# Patient Record
Sex: FEMALE | Race: WHITE | NOT HISPANIC OR LATINO | Employment: OTHER | URBAN - METROPOLITAN AREA
[De-identification: names, ages, dates, MRNs, and addresses within clinical notes are randomized per-mention and may not be internally consistent; named-entity substitution may affect disease eponyms.]

---

## 2019-02-01 ENCOUNTER — HOSPITAL ENCOUNTER (OUTPATIENT)
Dept: RADIOLOGY | Facility: HOSPITAL | Age: 84
Discharge: HOME | End: 2019-02-01
Attending: FAMILY MEDICINE
Payer: MEDICARE

## 2019-02-01 ENCOUNTER — CLINICAL SUPPORT (OUTPATIENT)
Dept: CARDIOLOGY | Facility: CLINIC | Age: 84
End: 2019-02-01
Payer: MEDICARE

## 2019-02-01 ENCOUNTER — OFFICE VISIT (OUTPATIENT)
Dept: FAMILY MEDICINE | Facility: CLINIC | Age: 84
End: 2019-02-01
Payer: MEDICARE

## 2019-02-01 VITALS
BODY MASS INDEX: 28.28 KG/M2 | TEMPERATURE: 100 F | WEIGHT: 140.31 LBS | HEART RATE: 128 BPM | HEIGHT: 59 IN | SYSTOLIC BLOOD PRESSURE: 122 MMHG | OXYGEN SATURATION: 96 % | DIASTOLIC BLOOD PRESSURE: 88 MMHG

## 2019-02-01 DIAGNOSIS — I25.10 CORONARY ARTERY DISEASE INVOLVING NATIVE CORONARY ARTERY, ANGINA PRESENCE UNSPECIFIED, UNSPECIFIED WHETHER NATIVE OR TRANSPLANTED HEART: ICD-10-CM

## 2019-02-01 DIAGNOSIS — K21.9 GASTROESOPHAGEAL REFLUX DISEASE, ESOPHAGITIS PRESENCE NOT SPECIFIED: ICD-10-CM

## 2019-02-01 DIAGNOSIS — R05.9 COUGH: ICD-10-CM

## 2019-02-01 DIAGNOSIS — D64.89 ANEMIA DUE TO OTHER CAUSE, NOT CLASSIFIED: ICD-10-CM

## 2019-02-01 DIAGNOSIS — R06.02 SOB (SHORTNESS OF BREATH): ICD-10-CM

## 2019-02-01 DIAGNOSIS — R05.9 COUGH: Primary | ICD-10-CM

## 2019-02-01 DIAGNOSIS — H61.22 CERUMINOSIS, LEFT: ICD-10-CM

## 2019-02-01 DIAGNOSIS — E03.9 ACQUIRED HYPOTHYROIDISM: ICD-10-CM

## 2019-02-01 DIAGNOSIS — R00.0 TACHYCARDIA: ICD-10-CM

## 2019-02-01 PROCEDURE — 99999 PR PBB SHADOW E&M-EST. PATIENT-LVL I: CPT | Mod: PBBFAC,,,

## 2019-02-01 PROCEDURE — 93010 ELECTROCARDIOGRAM REPORT: CPT | Mod: ,,, | Performed by: INTERNAL MEDICINE

## 2019-02-01 PROCEDURE — 99215 OFFICE O/P EST HI 40 MIN: CPT | Mod: S$PBB,,, | Performed by: FAMILY MEDICINE

## 2019-02-01 PROCEDURE — 99215 PR OFFICE/OUTPT VISIT, EST, LEVL V, 40-54 MIN: ICD-10-PCS | Mod: S$PBB,,, | Performed by: FAMILY MEDICINE

## 2019-02-01 PROCEDURE — 99999 PR PBB SHADOW E&M-NEW PATIENT-LVL IV: ICD-10-PCS | Mod: PBBFAC,,, | Performed by: FAMILY MEDICINE

## 2019-02-01 PROCEDURE — 99999 PR PBB SHADOW E&M-NEW PATIENT-LVL IV: CPT | Mod: PBBFAC,,, | Performed by: FAMILY MEDICINE

## 2019-02-01 PROCEDURE — 99211 OFF/OP EST MAY X REQ PHY/QHP: CPT | Mod: PBBFAC,25,27,PO

## 2019-02-01 PROCEDURE — 99204 OFFICE O/P NEW MOD 45 MIN: CPT | Mod: PBBFAC,25,PO | Performed by: FAMILY MEDICINE

## 2019-02-01 PROCEDURE — 71046 XR CHEST PA AND LATERAL: ICD-10-PCS | Mod: 26,,, | Performed by: RADIOLOGY

## 2019-02-01 PROCEDURE — 71046 X-RAY EXAM CHEST 2 VIEWS: CPT | Mod: 26,,, | Performed by: RADIOLOGY

## 2019-02-01 PROCEDURE — 99999 PR PBB SHADOW E&M-EST. PATIENT-LVL I: ICD-10-PCS | Mod: PBBFAC,,,

## 2019-02-01 PROCEDURE — 93005 ELECTROCARDIOGRAM TRACING: CPT | Mod: PBBFAC,PO | Performed by: FAMILY MEDICINE

## 2019-02-01 PROCEDURE — 71046 X-RAY EXAM CHEST 2 VIEWS: CPT | Mod: TC,FY,PO

## 2019-02-01 PROCEDURE — 93010 EKG 12-LEAD: ICD-10-PCS | Mod: ,,, | Performed by: INTERNAL MEDICINE

## 2019-02-01 RX ORDER — GUAIFENESIN 600 MG/1
1200 TABLET, EXTENDED RELEASE ORAL 2 TIMES DAILY
COMMUNITY

## 2019-02-01 RX ORDER — BENZONATATE 100 MG/1
100 CAPSULE ORAL 3 TIMES DAILY PRN
Qty: 30 CAPSULE | Refills: 0 | Status: SHIPPED | OUTPATIENT
Start: 2019-02-01 | End: 2019-02-11

## 2019-02-01 RX ORDER — FLUTICASONE PROPIONATE 50 MCG
1 SPRAY, SUSPENSION (ML) NASAL DAILY
COMMUNITY

## 2019-02-01 RX ORDER — FUROSEMIDE 20 MG/1
20 TABLET ORAL
COMMUNITY

## 2019-02-01 RX ORDER — OMEPRAZOLE 40 MG/1
40 CAPSULE, DELAYED RELEASE ORAL DAILY
Qty: 30 CAPSULE | Refills: 1 | Status: SHIPPED | OUTPATIENT
Start: 2019-02-01 | End: 2020-02-01

## 2019-02-01 RX ORDER — METOPROLOL SUCCINATE 25 MG/1
12.5 TABLET, EXTENDED RELEASE ORAL
COMMUNITY
Start: 2013-09-06

## 2019-02-01 RX ORDER — POTASSIUM CHLORIDE 750 MG/1
10 CAPSULE, EXTENDED RELEASE ORAL
COMMUNITY
Start: 2018-06-12

## 2019-02-01 NOTE — PATIENT INSTRUCTIONS
Acute Bronchitis  Your healthcare provider has told you that you have acute bronchitis. Bronchitis is infection or inflammation of the bronchial tubes (airways in the lungs). Normally, air moves easily in and out of the airways. Bronchitis narrows the airways, making it harder for air to flow in and out of the lungs. This causes symptoms such as shortness of breath, coughing up yellow or green mucus, and wheezing. Bronchitis can be acute or chronic. Acute means the condition comes on quickly and goes away in a short time, usually within 3 to 10 days. Chronic means a condition lasts a long time and often comes back.    What causes acute bronchitis?  Acute bronchitis almost always starts as a viral respiratory infection, such as a cold or the flu. Certain factors make it more likely for a cold or flu to turn into bronchitis. These include being very young, being elderly, having a heart or lung problem, or having a weak immune system. Cigarette smoking also makes bronchitis more likely.  When bronchitis develops, the airways become swollen. The airways may also become infected with bacteria. This is known as a secondary infection.  Diagnosing acute bronchitis  Your healthcare provider will examine you and ask about your symptoms and health history. You may also have a sputum culture to test the fluid in your lungs. Chest X-rays may be done to look for infection in the lungs.  Treating acute bronchitis  Bronchitis usually clears up as the cold or flu goes away. You can help feel better faster by doing the following:  · Take medicine as directed. You may be told to take ibuprofen or other over-the-counter medicines. These help relieve inflammation in your bronchial tubes. Your healthcare provider may prescribe an inhaler to help open up the bronchial tubes. Most of the time, acute bronchitis is caused by a viral infection. Antibiotics are usually not prescribed for viral infections.  · Drink plenty of fluids, such as  water, juice, or warm soup. Fluids loosen mucus so that you can cough it up. This helps you breathe more easily. Fluids also prevent dehydration.  · Make sure you get plenty of rest.  · Do not smoke. Do not allow anyone else to smoke in your home.  Recovery and follow-up  Follow up with your doctor as you are told. You will likely feel better in a week or two. But a dry cough can linger beyond that time. Let your doctor know if you still have symptoms (other than a dry cough) after 2 weeks, or if youre prone to getting bronchial infections. Take steps to protect yourself from future infections. These steps include stopping smoking and avoiding tobacco smoke, washing your hands often, and getting a yearly flu shot.  When to call your healthcare provider  Call the healthcare provider if you have any of the following:  · Fever of 100.4°F (38.0°C) or higher, or as advised  · Symptoms that get worse, or new symptoms  · Trouble breathing  · Symptoms that dont start to improve within a week, or within 3 days of taking antibiotics   Date Last Reviewed: 12/1/2016  © 0369-5136 [employer]. [address] [place] [address], [place] [address]. All rights reserved. This information is not intended as a substitute for professional medical care. Always follow your healthcare professional's instructions.

## 2019-02-01 NOTE — PROGRESS NOTES
Subjective:       Patient ID: Eladio Staples is a 88 y.o. female.    Chief Complaint: Cough, SOB,   Cough for more than 2 weeks  Cough is frequent with white and clear sputum  Sneezing, subjective fever and chills in the past 2 weeks  Rhinorrhea and both ears are clogged and she cannot hear well.Her grand daughter tried cleaning it with peroxide.  Appetite: No change. She is eating and drinking normal  Activity: Tired. Stays in the wheelchair most of the time and gets SOB with activity.  Denies weight gain, daughter says she has shrunk.  She has not being to the doc in 2 years.  She has a thyroid problem for which she has been on medication in the past and then stopped.  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   She has CHF, postural syncope with dizziness and sees Dr Longo.   Tachycardia on exam: Patient is supposed to be on BB. Daughter said she does not like taking her meds and she had to force her to come to the clinic    Past Medical History:   Diagnosis Date    Cancer     Hiatal hernia     Lung cancer      History reviewed. No pertinent family history.  Social History     Socioeconomic History    Marital status:      Spouse name: Not on file    Number of children: Not on file    Years of education: Not on file    Highest education level: Not on file   Social Needs    Financial resource strain: Not on file    Food insecurity - worry: Not on file    Food insecurity - inability: Not on file    Transportation needs - medical: Not on file    Transportation needs - non-medical: Not on file   Occupational History    Not on file   Tobacco Use    Smoking status: Never Smoker    Smokeless tobacco: Never Used   Substance and Sexual Activity    Alcohol use: No     Frequency: Never    Drug use: No    Sexual activity: No   Other Topics Concern    Not on file   Social History Narrative    Not on file     Outpatient Encounter Medications as of 2/1/2019    Medication Sig Dispense Refill    fluticasone (FLONASE) 50 mcg/actuation nasal spray 1 spray by Each Nare route once daily.      furosemide (LASIX) 20 MG tablet Take 20 mg by mouth.      guaiFENesin (MUCINEX) 600 mg 12 hr tablet Take 1,200 mg by mouth 2 (two) times daily.      metoprolol succinate (TOPROL-XL) 25 MG 24 hr tablet Take 12.5 mg by mouth.      potassium chloride (MICRO-K) 10 MEQ CpSR Take 10 mEq by mouth.      benzonatate (TESSALON) 100 MG capsule Take 1 capsule (100 mg total) by mouth 3 (three) times daily as needed for Cough. 30 capsule 0    omeprazole (PRILOSEC) 40 MG capsule Take 1 capsule (40 mg total) by mouth once daily. 30 capsule 1     No facility-administered encounter medications on file as of 2/1/2019.      Narrative     EXAMINATION:  XR CHEST PA AND LATERAL    CLINICAL HISTORY:  Cough    TECHNIQUE:  PA and lateral views of the chest were performed.    COMPARISON:  None    FINDINGS:  Cardiac silhouette and mediastinal contours are normal.  Lungs are clear.  Osseous structures are osteopenic with mild superior endplate compression deformities of upper thoracic vertebral bodies, age indeterminate.  Old fracture deformity of the right humeral head neck junction with degenerative changes of the bilateral shoulders.      Impression       No acute cardiopulmonary process.  Osteopenia with age indeterminate upper thoracic vertebral body mild compression deformities.  Correlate clinically.         Review of Systems   Constitutional: Negative for chills and fever.   HENT: Positive for postnasal drip, rhinorrhea and sore throat. Negative for congestion, facial swelling, sinus pressure and sinus pain.    Eyes: Negative for discharge and itching.   Respiratory: Positive for cough and shortness of breath. Negative for chest tightness and wheezing.    Cardiovascular: Negative for chest pain, palpitations and leg swelling.   Gastrointestinal: Negative for abdominal pain, nausea and vomiting.  "  Endocrine: Negative for cold intolerance and heat intolerance.   Genitourinary: Negative for dysuria and flank pain.   Musculoskeletal: Negative for myalgias and neck stiffness.   Skin: Negative for pallor and wound.   Neurological: Negative for facial asymmetry and weakness.   Psychiatric/Behavioral: Negative for agitation and suicidal ideas.       Objective:      /88 (BP Location: Right arm, Patient Position: Sitting, BP Method: Medium (Manual))   Pulse (!) 128   Temp 99.6 °F (37.6 °C) (Oral)   Ht 4' 10.5" (1.486 m)   Wt 63.6 kg (140 lb 5.2 oz)   SpO2 96%   BMI 28.83 kg/m²   Physical Exam   Constitutional: She is oriented to person, place, and time. She appears well-developed. No distress.   HENT:   Head: Normocephalic and atraumatic.   Right Ear: External ear normal.   Left Ear: External ear normal.   Eyes: Conjunctivae and EOM are normal.   Neck: Neck supple. No thyromegaly present.   Cardiovascular: Tachycardia present.   Pulmonary/Chest: Effort normal and breath sounds normal. No respiratory distress. She has no wheezes.   Abdominal: Soft. She exhibits no distension.   Genitourinary:   Genitourinary Comments: deferred   Musculoskeletal: She exhibits no edema or deformity.   Lymphadenopathy:        Head (right side): No submandibular adenopathy present.        Head (left side): No submandibular adenopathy present.     She has no cervical adenopathy.   Neurological: She is alert and oriented to person, place, and time.   Skin: Skin is warm and dry.   Psychiatric: She has a normal mood and affect. Her behavior is normal.   Nursing note and vitals reviewed.      No results found for this or any previous visit.  Assessment:       1. Cough    2. Acquired hypothyroidism    3. Anemia due to other cause, not classified    4. Coronary artery disease involving native coronary artery, angina presence unspecified, unspecified whether native or transplanted heart    5. Ceruminosis, bilateral     6. " Gastroesophageal reflux disease, esophagitis presence not specified    7. SOB (shortness of breath)    8. Tachycardia        Plan:   Patient with multiple complaints, most of which are chronic in nature, she is not sick appearing but the severity and duration of the cough is worrisome. See note below.  Cough: persistent cough. Screen for path with xray-fluid, consolidation or embolism  -     X-Ray Chest PA And Lateral; Future; Expected date: 02/01/2019  -     Comprehensive metabolic panel; Future; Expected date: 02/01/2019  -     Procalcitonin; Future; Expected date: 02/01/2019  -     benzonatate (TESSALON) 100 MG capsule; Take 1 capsule (100 mg total) by mouth 3 (three) times daily as needed for Cough.  Dispense: 30 capsule; Refill: 0  -     D dimer, quantitative; Future; Expected date: 02/01/2019   I personally reviewed the images from his chest x-rays which shows no evidence of infiltrate or effusion.     Acquired hypothyroidism  -     TSH; Future; Expected date: 02/01/2019  -     T4, free; Future; Expected date: 02/01/2019    Anemia due to other cause, not classified  -     CBC auto differential; Future; Expected date: 02/01/2019    Coronary artery disease involving native coronary artery, angina presence unspecified, unspecified whether native or transplanted heart  -     Lipid panel; Future; Expected date: 02/01/2019    Ceruminosis, bilateral   -     Ear wax removal  -     Ambulatory referral to ENT  Ceruminosis is noted. Wax is removed by manual debridement and irrigation. The right ear is cleared but the left ear will not.   Instructions for home care to prevent wax buildup are given.  ENT referral for wax removal    Gastroesophageal reflux disease, esophagitis presence not specified  -     omeprazole (PRILOSEC) 40 MG capsule; Take 1 capsule (40 mg total) by mouth once daily.  Dispense: 30 capsule; Refill: 1    SOB (shortness of breath)  -     Brain natriuretic peptide; Future; Expected date:  02/01/2019    Tachycardia  -     EKG 12-lead; Future; Expected date: 02/01/2019  -Junctional rhythm with HR of 126, result was discussed with patient and daughter  -Take you Toprol as ordered and follow up with Cardiology

## 2019-02-04 ENCOUNTER — TELEPHONE (OUTPATIENT)
Dept: FAMILY MEDICINE | Facility: CLINIC | Age: 84
End: 2019-02-04

## 2019-02-04 NOTE — TELEPHONE ENCOUNTER
SW daughter Lisa. She advised she would have her sister call back in the morning. Patient would rather us give her results over the phone.

## 2019-02-04 NOTE — TELEPHONE ENCOUNTER
----- Message from Jazmin Crockett sent at 2/4/2019  4:34 PM CST -----  Contact: Pt/daughter  Pt daughter/Lisa and pt calling states returning nurse phone call made just a minute ago. Can be reached today at ...[phone]

## 2019-02-05 ENCOUNTER — OFFICE VISIT (OUTPATIENT)
Dept: FAMILY MEDICINE | Facility: CLINIC | Age: 84
End: 2019-02-05
Payer: MEDICARE

## 2019-02-05 VITALS
WEIGHT: 138 LBS | TEMPERATURE: 99 F | BODY MASS INDEX: 27.82 KG/M2 | HEART RATE: 107 BPM | OXYGEN SATURATION: 95 % | DIASTOLIC BLOOD PRESSURE: 76 MMHG | HEIGHT: 59 IN | SYSTOLIC BLOOD PRESSURE: 120 MMHG

## 2019-02-05 DIAGNOSIS — N18.30 CKD (CHRONIC KIDNEY DISEASE), STAGE III: ICD-10-CM

## 2019-02-05 DIAGNOSIS — R05.9 COUGH: ICD-10-CM

## 2019-02-05 DIAGNOSIS — D69.6 THROMBOCYTOPENIA: ICD-10-CM

## 2019-02-05 DIAGNOSIS — R00.0 SINUS TACHYCARDIA: Primary | ICD-10-CM

## 2019-02-05 PROCEDURE — 99999 PR PBB SHADOW E&M-EST. PATIENT-LVL IV: ICD-10-PCS | Mod: PBBFAC,,, | Performed by: FAMILY MEDICINE

## 2019-02-05 PROCEDURE — 99214 OFFICE O/P EST MOD 30 MIN: CPT | Mod: PBBFAC,PO | Performed by: FAMILY MEDICINE

## 2019-02-05 PROCEDURE — 99213 PR OFFICE/OUTPT VISIT, EST, LEVL III, 20-29 MIN: ICD-10-PCS | Mod: S$PBB,,, | Performed by: FAMILY MEDICINE

## 2019-02-05 PROCEDURE — 99999 PR PBB SHADOW E&M-EST. PATIENT-LVL IV: CPT | Mod: PBBFAC,,, | Performed by: FAMILY MEDICINE

## 2019-02-05 PROCEDURE — 99213 OFFICE O/P EST LOW 20 MIN: CPT | Mod: S$PBB,,, | Performed by: FAMILY MEDICINE

## 2019-02-05 NOTE — PROGRESS NOTES
Subjective:       Patient ID: Eladio Staples is a 88 y.o. female.    Chief Complaint: FU on cough and result review    Cough x 2-3 weeks, cxr was normal, procalcitonin is negative, cough is responding to mucinex  She has a history of CHF and BNP was normal and she has lost 2lbs in the past one week when she started taking her lasix.  Ddimer was negative.  Family concerned that she is not taking her meds, patient insisting that she needs to do things forself  I reviewed the rest of her lab, CBC showed thrombocytopenia for which she has been following up with Dr. Toure.  Sinus Tach: EKG was consistent for ST and today it is 107, she says she takes her BB, family thinks otherwise.  Patient in very good spirit, active and verbalized feeling all right.  Past Medical History:   Diagnosis Date    Cancer     Hiatal hernia     Lung cancer      History reviewed. No pertinent family history.  Social History     Socioeconomic History    Marital status:      Spouse name: Not on file    Number of children: Not on file    Years of education: Not on file    Highest education level: Not on file   Social Needs    Financial resource strain: Not on file    Food insecurity - worry: Not on file    Food insecurity - inability: Not on file    Transportation needs - medical: Not on file    Transportation needs - non-medical: Not on file   Occupational History    Not on file   Tobacco Use    Smoking status: Never Smoker    Smokeless tobacco: Never Used   Substance and Sexual Activity    Alcohol use: No     Frequency: Never    Drug use: No    Sexual activity: No   Other Topics Concern    Not on file   Social History Narrative    Not on file     Outpatient Encounter Medications as of 2/5/2019   Medication Sig Dispense Refill    benzonatate (TESSALON) 100 MG capsule Take 1 capsule (100 mg total) by mouth 3 (three) times daily as needed for Cough. 30 capsule 0    fluticasone (FLONASE) 50 mcg/actuation nasal spray 1  "spray by Each Nare route once daily.      furosemide (LASIX) 20 MG tablet Take 20 mg by mouth.      guaiFENesin (MUCINEX) 600 mg 12 hr tablet Take 1,200 mg by mouth 2 (two) times daily.      metoprolol succinate (TOPROL-XL) 25 MG 24 hr tablet Take 12.5 mg by mouth.      omeprazole (PRILOSEC) 40 MG capsule Take 1 capsule (40 mg total) by mouth once daily. 30 capsule 1    potassium chloride (MICRO-K) 10 MEQ CpSR Take 10 mEq by mouth.       No facility-administered encounter medications on file as of 2/5/2019.        Review of Systems   Constitutional: Negative for chills and fever.   HENT: Negative for congestion and facial swelling.    Eyes: Negative for discharge and itching.   Respiratory: Negative for cough and wheezing.    Cardiovascular: Negative for chest pain and palpitations.   Gastrointestinal: Negative for abdominal pain, nausea and vomiting.   Endocrine: Negative for cold intolerance and heat intolerance.   Genitourinary: Negative for dysuria and flank pain.   Musculoskeletal: Negative for myalgias and neck stiffness.   Skin: Negative for pallor and wound.   Neurological: Negative for facial asymmetry and weakness.   Psychiatric/Behavioral: Negative for agitation and suicidal ideas.       Objective:      /76 (BP Location: Right arm, Patient Position: Sitting, BP Method: Medium (Manual))   Pulse 107   Temp 98.7 °F (37.1 °C) (Tympanic)   Ht 4' 10.5" (1.486 m)   Wt 62.6 kg (138 lb 0.1 oz)   SpO2 95%   BMI 28.35 kg/m²   Physical Exam   Constitutional: She is oriented to person, place, and time. She appears well-developed. No distress.   HENT:   Head: Normocephalic and atraumatic.   Right Ear: External ear normal.   Left Ear: External ear normal.   Eyes: Conjunctivae and EOM are normal.   Neck: Neck supple. No thyromegaly present.   Cardiovascular: Normal rate and regular rhythm.   Pulmonary/Chest: Effort normal. No respiratory distress.   Abdominal: Soft. She exhibits no distension. "   Genitourinary:   Genitourinary Comments: deferred   Musculoskeletal: She exhibits no edema or deformity.   Lymphadenopathy:        Head (right side): No submandibular adenopathy present.        Head (left side): No submandibular adenopathy present.     She has no cervical adenopathy.   Neurological: She is alert and oriented to person, place, and time.   Skin: Skin is warm and dry.   Psychiatric: She has a normal mood and affect. Her behavior is normal.   Nursing note and vitals reviewed.      Results for orders placed or performed in visit on 02/01/19   CBC auto differential   Result Value Ref Range    WBC 4.00 3.90 - 12.70 K/uL    RBC 4.24 4.00 - 5.40 M/uL    Hemoglobin 12.6 12.0 - 16.0 g/dL    Hematocrit 39.0 37.0 - 48.5 %    MCV 92 82 - 98 fL    MCH 29.7 27.0 - 31.0 pg    MCHC 32.3 32.0 - 36.0 g/dL    RDW 12.9 11.5 - 14.5 %    Platelets 116 (L) 150 - 350 K/uL    MPV 9.9 9.2 - 12.9 fL    Immature Granulocytes 0.3 0.0 - 0.5 %    Gran # (ANC) 3.0 1.8 - 7.7 K/uL    Immature Grans (Abs) 0.01 0.00 - 0.04 K/uL    Lymph # 0.4 (L) 1.0 - 4.8 K/uL    Mono # 0.4 0.3 - 1.0 K/uL    Eos # 0.2 0.0 - 0.5 K/uL    Baso # 0.04 0.00 - 0.20 K/uL    nRBC 0 0 /100 WBC    Gran% 74.1 (H) 38.0 - 73.0 %    Lymph% 9.3 (L) 18.0 - 48.0 %    Mono% 11.0 4.0 - 15.0 %    Eosinophil% 4.3 0.0 - 8.0 %    Basophil% 1.0 0.0 - 1.9 %    Differential Method Automated    Comprehensive metabolic panel   Result Value Ref Range    Sodium 140 136 - 145 mmol/L    Potassium 4.1 3.5 - 5.1 mmol/L    Chloride 106 95 - 110 mmol/L    CO2 25 23 - 29 mmol/L    Glucose 154 (H) 70 - 110 mg/dL    BUN, Bld 16 8 - 23 mg/dL    Creatinine 0.9 0.5 - 1.4 mg/dL    Calcium 9.6 8.7 - 10.5 mg/dL    Total Protein 7.2 6.0 - 8.4 g/dL    Albumin 4.1 3.5 - 5.2 g/dL    Total Bilirubin 0.8 0.1 - 1.0 mg/dL    Alkaline Phosphatase 98 55 - 135 U/L    AST 27 10 - 40 U/L    ALT 16 10 - 44 U/L    Anion Gap 9 8 - 16 mmol/L    eGFR if African American >60.0 >60 mL/min/1.73 m^2    eGFR if non   57.3 (A) >60 mL/min/1.73 m^2   Procalcitonin   Result Value Ref Range    Procalcitonin 0.03 <0.25 ng/mL   TSH   Result Value Ref Range    TSH 3.386 0.400 - 4.000 uIU/mL   T4, free   Result Value Ref Range    Free T4 1.06 0.71 - 1.51 ng/dL   Lipid panel   Result Value Ref Range    Cholesterol 187 120 - 199 mg/dL    Triglycerides 119 30 - 150 mg/dL    HDL 46 40 - 75 mg/dL    LDL Cholesterol 117.2 63.0 - 159.0 mg/dL    HDL/Chol Ratio 24.6 20.0 - 50.0 %    Total Cholesterol/HDL Ratio 4.1 2.0 - 5.0    Non-HDL Cholesterol 141 mg/dL   D dimer, quantitative   Result Value Ref Range    D-Dimer 0.32 <0.50 mg/L FEU   Brain natriuretic peptide   Result Value Ref Range    BNP 38 0 - 99 pg/mL     Assessment:       1. Sinus tachycardia    2. Cough    3. Thrombocytopenia    4. CKD (chronic kidney disease), stage III        Plan:   Sinus tachycardia  Comments:  asymptomatic  FU with Dr Longo  Cough    Thrombocytopenia  FU with Dr Toure  CKD (chronic kidney disease), stage III  Advised to avoid NSAIDs

## 2019-02-06 NOTE — PATIENT INSTRUCTIONS
Prevention Guidelines, Women Ages 65 and Older  Screening tests and vaccines are an important part of managing your health. Health counseling is essential, too. Below are guidelines for these, for women ages 65 and older. Talk with your healthcare provider to make sure youre up to date on what you need.  Screening Who needs it How often   Type 2 diabetes or prediabetes All adults beginning at age 45 and adults without symptoms at any age who are overweight or obese and have 1 or more additional risk factors for diabetes At least every 3 years   Alcohol misuse All women in this age group At routine exams   Blood pressure All women in this age group Every 2 years if your blood pressure is less than 120/80 mm Hg; yearly if your systolic blood pressure is 120 to 139 mm Hg, or your diastolic blood pressure reading is 80 to 89 mm Hg   Breast cancer All women in this age group Yearly mammogram and clinical breast exam1   Cervical cancer Only women who had abnormal screening results before age 65 Talk with your healthcare provider   Chlamydia Women at increased risk for infection At routine exams   Colorectal cancer All women in this age group1 Flexible sigmoidoscopy every 5 years, or colonoscopy every 10 years, or double-contrast barium enema every 5 years; yearly fecal occult blood test or fecal immunochemical test; or a stool DNA test as often as your healthcare provider advises; talk with your healthcare provider about which tests are best for you   Depression All women in this age group At routine exams   Gonorrhea Sexually active women at increased risk for infection At routine exams   Hepatitis C Anyone at increased risk; 1 time for those born between 1945 and 1965 At routine exams   High cholesterol or triglycerides All women in this age group who are at risk for coronary artery disease At least every 5 years   HIV Women at increased risk for infection - talk with your healthcare provider At routine exams   Lung  cancer Adults age 55 to 80 who have smoked Yearly screening in smokers with 30 pack-year history of smoking or who quit within 15 years   Obesity All women in this age group At routine exams   Osteoporosis All women in this age group Bone density test at age 65, then follow-up as advised by your healthcare provider   Syphilis Women at increased risk for infection - talk with your healthcare provider At routine exams   Thyroid-Stimulating Hormone (TSH) All women in this age group Every 5 years   Tuberculosis Women at increased risk for infection - talk with your healthcare provider Ask your healthcare provider   Vision All women in this age group Every 1 to 2 years; if you have a chronic health condition, ask your healthcare provider if you need exams more often   Vaccine Who needs it How often   Chickenpox (varicella) All women in this age group who have no record of this infection or vaccine 2 doses; second dose should be given at least 4 weeks after the first dose   Hepatitis A Women at increased risk for infection - talk with your healthcare provider 2 doses given 6 months apart   Hepatitis B Women at increased risk for infection - talk with your healthcare provider 3 doses over 6 months; second dose should be given 1 month after the first dose; the third dose should be given at least 2 months after the second dose and at least 4 months after the first dose   Haemophilus influenza Type B (HIB) Women at increased risk for infection - talk with your healthcare provider 1 to 3 doses   Influenza (flu) All women in this age group Once a year   Pneumococcal conjugate vaccine (PCV13) and pneumococcal polysaccharide vaccine (PPSV23) All women in this age group 1 dose of each vaccine   Tetanus/diphtheria/pertussis (Td/Tdap) booster All women in this age group Td every 10 years, or a one-time dose of Tdap instead of a Td booster after age 18, then Td every 10 years   Zoster All women in this age group 1 dose   Counseling  Who needs it How often   Diet and exercise Women who are overweight or obese When diagnosed, and then at routine exams   Fall prevention (exercise and vitamin D supplements) All women in this age group At routine exams   Sexually transmitted infection prevention Women at increased risk for infection - talk with your healthcare provider At routine exams   Use of daily aspirin Women ages 55 and up in this age group who are at risk for cardiovascular health problems such as stroke When your risk is known   Use of tobacco and the health effects it can cause All women in this age group Every exam   1[employer]  Date Last Reviewed: 8/9/2015  © 8785-6941 [employer]. [address] [place] [address], [place] [address]. All rights reserved. This information is not intended as a substitute for professional medical care. Always follow your healthcare professional's instructions.

## 2019-02-25 NOTE — PROGRESS NOTES
"Referring Provider:    Casandra Post [place]  [id number] [address] [address]  Subjective:   Patient: Eladio Staples [id number], :1930   Visit date:2019 3:15 PM    Chief Complaint:  No chief complaint on file.    HPI:  Eladio is a 88 y.o. female who I was asked to see in consultation for evaluation of the following issue(s):    Hearing Loss:    She describes a both sided hearing loss starting many years ago and has been unchanged.      The patient reports the following risk factors for hearing loss (Negative unless checked off):   [] Familial deafness   [] Ototoxic medication exposure  [] Acoustic trauma  []  Occupational exposure  [] Head injury or trauma  [] Otologic infection  [] History of meningitis  [] Ear surgery (other than pediatric tympanostomy tubes)  [] Metabolic disease      Severity: no  Quality: muffled  Modifying factors:  None  Associated symptoms include   [] Vertigo  [] Tinnitus  [] Otalgia  [] Drainage or pain   Previous treatments include none.    Denies tinnitus or dizziness.    Patient and family (daughter and granddaughter) report increased sinus pressure, sinus HA's along frontal lobe exacerbated with bending over. Patient reports fever yesterday, family is unsure of Tmax. Pt c/o bilateral ear pain, dull, constant, ears "feel full". Patient takes Zyrtec for allergies, she has taken this for over the past year and does not take consistently. Also, uses Flonase not consistently. She c/o productive cough x 1 week, yellow/green mucus, she is taking mucinex for cough with mild relief.     Review of Systems:  -     Allergic/Immunologic: has No Known Allergies..  -     Constitutional: Current temp:       Her meds, allergies, medical, surgical, social & family histories were reviewed & updated:  -     She has a current medication list which includes the following prescription(s): fluticasone, furosemide, guaifenesin, metoprolol succinate, omeprazole, and potassium chloride.  -     " She  has a past medical history of Cancer, Hiatal hernia, and Lung cancer.   -     She does not have any pertinent problems on file.   -     She  has a past surgical history that includes Galbladder Surgery; Hip surgery (Right, 2015); and Hand surgery.  -     She  reports that  has never smoked. she has never used smokeless tobacco. She reports that she does not drink alcohol or use drugs.  -     Her family history is not on file.  -     She is allergic to pseudoephedrine hcl; morphine; and sulfa (sulfonamide antibiotics).    Objective:     Physical Exam:  Vitals:  There were no vitals taken for this visit.  General appearance:  Well developed, well nourished    Eyes:  Extraocular motions intact, PERRL    Communication:  no hoarseness, no dysphonia    Ears:  Bilateral TM's are bulging, right TM is very opaque and erythematous, both intact. Bilateral EAC's WNL.   Nose:  No masses/lesions of external nose, nasal mucosa, septum, and turbinates were within normal limits.  Mouth:  No mass/lesion of lips, teeth, gums, hard/soft palate, tongue, tonsils, or oropharynx. Mild erythema of oropharynx.     Cardiovascular:  No pedal edema; Radial Pulses +2     Neck & Lymphatics:  No cervical lymphadenopathy, no neck mass/crepitus/ asymmetry, trachea is midline, no thyroid enlargement/tenderness/mass.    Psych: Oriented x3,  Alert with normal mood and affect.     Respiration/Chest:  Symmetric expansion during respiration, normal respiratory effort.    Skin:  Warm and intact. No ulcerations of face, scalp, neck.      AUDIOGRAM:           Assessment & Plan:   There are no diagnoses linked to this encounter.    Eladio presents with new problem(s) today. We discussed that hearing loss may be multifactorial requiring additional testing to determine various causes of hearing loss and the degree to which they are contributing to symptoms. The prognosis and progression of hearing loss is uncertain and significantly dependent on the  etiology.     -HEARING LOSS-  I spent a considerable amount of time educating the patient on hearing and hearing loss.  We discussed the basic characteristics of conductive hearing loss versus sensorineural hearing loss and the significant differences in treatment options between the two categories.      We discussed that in cases of conductive hearing loss, this suggests a mechanical disorder that sometimes can be improved with medications &/or surgery.  It may occur secondary to external ear pathology (atresia, otitis externa, etc), tympanic membrane disorders (large perforations, immobility due to scarring or eustachian tube dysfunction), and middle ear disorders (effusions, ossicular disorders).    Sensorineural hearing loss is the expected hearing loss pattern with aging, but some disorders such as Meniere's may accelerate this process.  Additionally, amplification with hearing aids is generally the best option for hearing rehabilitation, except where the hearing loss is profound.  We discussed that this generally does not represent a dangerous condition, but in cases where there is a large discrepancy between the two ears in terms of nerve function, more investigation is often necessary due to the possiblity of vestibular schwannomas or meningiomas at the cerebellopontine angle.  The definitive test for this is an MRI with gadolinium.  However, these masses are usually very slow growing (1-2mm/year), so patients may elect to repeat an audiogram in about 6 months or obtain an ABR so long as they understand that this may result in a delay in diagnosis (although very unlikely that this would have a significant clinical impact on their outcome due to the slow growing nature of these masses) with the understanding that if ABR is abnormal or asymmetry increases, an MRI would then be required.    Eladio  presents with what appears to be sensorineural hearing loss.  Based on this, my recommendation is:    HA's, pt is  not interested in this at this time.     Augmentin x 10 days - advised taking a probiotic with this.   Flonase/Astelin  Trial with Xyzal, d/c Zyrtec.   Recheck in 2 weeks    Over 25 minutes were spent in face to face contact with the patient with greater than 50% spent discussing their diagnosis and coordination of care.       Thank you for allowing me to participate in the care of Eladio.    Kateryna Webb PA-C

## 2019-02-26 ENCOUNTER — OFFICE VISIT (OUTPATIENT)
Dept: OTOLARYNGOLOGY | Facility: CLINIC | Age: 84
End: 2019-02-26
Payer: MEDICARE

## 2019-02-26 ENCOUNTER — CLINICAL SUPPORT (OUTPATIENT)
Dept: AUDIOLOGY | Facility: CLINIC | Age: 84
End: 2019-02-26
Payer: MEDICARE

## 2019-02-26 VITALS
TEMPERATURE: 100 F | DIASTOLIC BLOOD PRESSURE: 78 MMHG | SYSTOLIC BLOOD PRESSURE: 135 MMHG | BODY MASS INDEX: 28.26 KG/M2 | WEIGHT: 137.56 LBS | HEART RATE: 95 BPM

## 2019-02-26 DIAGNOSIS — J01.90 ACUTE RHINOSINUSITIS: Primary | ICD-10-CM

## 2019-02-26 DIAGNOSIS — H90.3 SENSORY HEARING LOSS, BILATERAL: Primary | ICD-10-CM

## 2019-02-26 DIAGNOSIS — H66.001 ACUTE SUPPURATIVE OTITIS MEDIA OF RIGHT EAR WITHOUT SPONTANEOUS RUPTURE OF TYMPANIC MEMBRANE, RECURRENCE NOT SPECIFIED: ICD-10-CM

## 2019-02-26 PROCEDURE — 99213 OFFICE O/P EST LOW 20 MIN: CPT | Mod: PBBFAC,25 | Performed by: PHYSICIAN ASSISTANT

## 2019-02-26 PROCEDURE — 99204 PR OFFICE/OUTPT VISIT, NEW, LEVL IV, 45-59 MIN: ICD-10-PCS | Mod: S$PBB,,, | Performed by: PHYSICIAN ASSISTANT

## 2019-02-26 PROCEDURE — 99999 PR PBB SHADOW E&M-EST. PATIENT-LVL III: ICD-10-PCS | Mod: PBBFAC,,, | Performed by: PHYSICIAN ASSISTANT

## 2019-02-26 PROCEDURE — 99999 PR PBB SHADOW E&M-EST. PATIENT-LVL III: CPT | Mod: PBBFAC,,, | Performed by: PHYSICIAN ASSISTANT

## 2019-02-26 PROCEDURE — 92567 TYMPANOMETRY: CPT | Mod: PBBFAC | Performed by: AUDIOLOGIST

## 2019-02-26 PROCEDURE — 92557 COMPREHENSIVE HEARING TEST: CPT | Mod: PBBFAC | Performed by: AUDIOLOGIST

## 2019-02-26 PROCEDURE — 99204 OFFICE O/P NEW MOD 45 MIN: CPT | Mod: S$PBB,,, | Performed by: PHYSICIAN ASSISTANT

## 2019-02-26 RX ORDER — AMOXICILLIN AND CLAVULANATE POTASSIUM 875; 125 MG/1; MG/1
1 TABLET, FILM COATED ORAL EVERY 12 HOURS
Qty: 20 TABLET | Refills: 0 | Status: SHIPPED | OUTPATIENT
Start: 2019-02-26 | End: 2019-03-08

## 2019-02-26 RX ORDER — LEVOCETIRIZINE DIHYDROCHLORIDE 5 MG/1
5 TABLET, FILM COATED ORAL NIGHTLY
Qty: 30 TABLET | Refills: 11 | Status: SHIPPED | OUTPATIENT
Start: 2019-02-26 | End: 2020-02-26

## 2019-02-26 RX ORDER — FLUTICASONE PROPIONATE 50 MCG
2 SPRAY, SUSPENSION (ML) NASAL 2 TIMES DAILY
Qty: 9.9 ML | Refills: 11 | Status: SHIPPED | OUTPATIENT
Start: 2019-02-26

## 2019-02-26 RX ORDER — AZELASTINE 1 MG/ML
1 SPRAY, METERED NASAL 2 TIMES DAILY
Qty: 30 ML | Refills: 11 | Status: SHIPPED | OUTPATIENT
Start: 2019-02-26 | End: 2020-02-26

## 2019-02-26 RX ORDER — FLUCONAZOLE 150 MG/1
150 TABLET ORAL DAILY
Qty: 1 TABLET | Refills: 0 | Status: SHIPPED | OUTPATIENT
Start: 2019-02-26 | End: 2019-02-27

## 2019-02-26 NOTE — PROGRESS NOTES
Eladio Staples was seen 02/26/2019 for an audiological evaluation.  Patient complains of long term bilateral hearing loss.  She reports sinus pain and pressure today.  She dnies tinnitus and dizziness.    Results reveal a mild-to-profound sensorineural hearing loss 250-8000 Hz for the right ear, and  mild-to-profound sensorineural hearing loss 250-8000 Hz for the left ear.   Speech Reception Thresholds were  35 dBHL for the right ear and 30 dBHL for the left ear.   Word recognition scores were fair for the right ear and fair for the left ear.   Tympanograms were Type A for the right ear and Type As for the left ear.    Patient was counseled on the above findings.    REC:  [facility]

## 2019-02-26 NOTE — LETTER
February 28, 2019      Casandra Post MD  [address] [address] [address]           [facility]  [address] [place] [id number]  Phone: [phone]  Fax: [phone]          Patient: Eladio Staples   MR Number: [id number]   YOB: 1930   Date of Visit: 2/26/2019       Dear Dr. Casandra Post:    Thank you for referring Eladio Staples to me for evaluation. Attached you will find relevant portions of my assessment and plan of care.    If you have questions, please do not hesitate to call me. I look forward to following Eladio Staples along with you.    Sincerely,    Kateryna Webb PA-C    Enclosure  CC:  No Recipients    If you would like to receive this communication electronically, please contact [email or web address] or [phone] to request more information on [facility] access.    For providers and/or their staff who would like to refer a patient to [facility], please contact us through our one-stop-shop provider referral line, [facility], at [phone].    If you feel you have received this communication in error or would no longer like to receive these types of communications, please e-mail [email or web address]

## 2019-02-27 ENCOUNTER — TELEPHONE (OUTPATIENT)
Dept: OTOLARYNGOLOGY | Facility: CLINIC | Age: 84
End: 2019-02-27

## 2019-02-27 NOTE — TELEPHONE ENCOUNTER
Spoke with daughter regarding accidentally taking Zyrtec and Xyzal both today. Mrs. Gonzalez denies any abnormal activities or signs from patient. Advised to increase fluids and to just separate her medications to only try the recommended Xyzal per Kateryna Webb.

## 2019-02-27 NOTE — TELEPHONE ENCOUNTER
----- Message from Marilee Cordoba sent at 2/27/2019 12:09 PM CST -----  Contact: pt Daughter - Ms Rosenberg  Pt daughter stated she used to two antihistamine this morning want to make pt will be ok, she can be reached at [phone] Thanks

## 2019-11-01 ENCOUNTER — PES CALL (OUTPATIENT)
Dept: ADMINISTRATIVE | Facility: CLINIC | Age: 84
End: 2019-11-01

## 2019-11-08 ENCOUNTER — OFFICE VISIT (OUTPATIENT)
Dept: URGENT CARE | Facility: CLINIC | Age: 84
End: 2019-11-08
Payer: MEDICARE

## 2019-11-08 VITALS
SYSTOLIC BLOOD PRESSURE: 119 MMHG | DIASTOLIC BLOOD PRESSURE: 69 MMHG | RESPIRATION RATE: 12 BRPM | HEART RATE: 98 BPM | WEIGHT: 138.31 LBS | HEIGHT: 62 IN | BODY MASS INDEX: 25.45 KG/M2 | TEMPERATURE: 99 F | OXYGEN SATURATION: 98 %

## 2019-11-08 DIAGNOSIS — J02.9 PHARYNGITIS, UNSPECIFIED ETIOLOGY: ICD-10-CM

## 2019-11-08 DIAGNOSIS — I10 ESSENTIAL HYPERTENSION: ICD-10-CM

## 2019-11-08 DIAGNOSIS — J01.90 ACUTE BACTERIAL SINUSITIS: Primary | ICD-10-CM

## 2019-11-08 DIAGNOSIS — B96.89 ACUTE BACTERIAL SINUSITIS: Primary | ICD-10-CM

## 2019-11-08 DIAGNOSIS — R59.1 LYMPHADENOPATHY: ICD-10-CM

## 2019-11-08 DIAGNOSIS — R05.9 COUGH: ICD-10-CM

## 2019-11-08 DIAGNOSIS — B37.9 CANDIDIASIS: ICD-10-CM

## 2019-11-08 PROBLEM — K21.9 GERD (GASTROESOPHAGEAL REFLUX DISEASE): Status: ACTIVE | Noted: 2019-11-08

## 2019-11-08 PROCEDURE — 99999 PR PBB SHADOW E&M-EST. PATIENT-LVL IV: CPT | Mod: PBBFAC,,, | Performed by: NURSE PRACTITIONER

## 2019-11-08 PROCEDURE — 99999 PR PBB SHADOW E&M-EST. PATIENT-LVL IV: ICD-10-PCS | Mod: PBBFAC,,, | Performed by: NURSE PRACTITIONER

## 2019-11-08 PROCEDURE — 99214 PR OFFICE/OUTPT VISIT, EST, LEVL IV, 30-39 MIN: ICD-10-PCS | Mod: S$PBB,,, | Performed by: NURSE PRACTITIONER

## 2019-11-08 PROCEDURE — 99214 OFFICE O/P EST MOD 30 MIN: CPT | Mod: PBBFAC,PO | Performed by: NURSE PRACTITIONER

## 2019-11-08 PROCEDURE — 99214 OFFICE O/P EST MOD 30 MIN: CPT | Mod: S$PBB,,, | Performed by: NURSE PRACTITIONER

## 2019-11-08 RX ORDER — AMOXICILLIN 500 MG/1
500 TABLET, FILM COATED ORAL EVERY 12 HOURS
Qty: 14 TABLET | Refills: 0 | Status: SHIPPED | OUTPATIENT
Start: 2019-11-08 | End: 2019-11-15

## 2019-11-08 RX ORDER — FLUCONAZOLE 200 MG/1
200 TABLET ORAL DAILY
Qty: 2 TABLET | Refills: 0 | Status: SHIPPED | OUTPATIENT
Start: 2019-11-08 | End: 2019-11-10

## 2019-11-08 RX ORDER — BENZONATATE 100 MG/1
200 CAPSULE ORAL 3 TIMES DAILY PRN
Qty: 60 CAPSULE | Refills: 1 | Status: SHIPPED | OUTPATIENT
Start: 2019-11-08

## 2019-11-08 NOTE — PROGRESS NOTES
CHIEF COMPLAINT/REASON FOR VISIT: nasal congestion, post nasal drip, sore throat, facial pressure    HISTORY OF PRESENT ILLNESS:  87 y/o female with daughter and granddaughter complains of nasal congestion, post nasal drip, sore throat, facial pressure, ears feel full, ears popping and productive cough onset 4-5 days ago.  Complains of sinus infection.  Patient admits tried medications with little relief.  Patient states on antibiotics for sinus infection.  Denies chest pain, shortness of breath, nausea, vomiting, diarrhea, fever and no body aches.  Complains of problems swallowing.  Discussed the need for further evaluation with ENT and our Gastroenterology.  Granddaughter states patient develops a yeast infection due to antibiotics.  Requesting medication for yeast infection.     Past Medical History:   Diagnosis Date    Cancer     Hiatal hernia     Lung cancer          .  Past Surgical History:   Procedure Laterality Date    Galbladder Surgery      HAND SURGERY      HIP SURGERY Right 2015         Social History     Socioeconomic History    Marital status:      Spouse name: Not on file    Number of children: Not on file    Years of education: Not on file    Highest education level: Not on file   Occupational History    Not on file   Social Needs    Financial resource strain: Not on file    Food insecurity:     Worry: Not on file     Inability: Not on file    Transportation needs:     Medical: Not on file     Non-medical: Not on file   Tobacco Use    Smoking status: Never Smoker    Smokeless tobacco: Never Used   Substance and Sexual Activity    Alcohol use: No     Frequency: Never    Drug use: No    Sexual activity: Never   Lifestyle    Physical activity:     Days per week: Not on file     Minutes per session: Not on file    Stress: Not on file   Relationships    Social connections:     Talks on phone: Not on file     Gets together: Not on file     Attends Jehovah's witness service: Not on  file     Active member of club or organization: Not on file     Attends meetings of clubs or organizations: Not on file     Relationship status: Not on file   Other Topics Concern    Not on file   Social History Narrative    Not on file       History reviewed. No pertinent family history.      ROS:  GENERAL: reports no fever, chills.  SKIN: No rashes, itching or changes in color or texture of skin.  HEENT: reports headaches, nasal congestion, postnasal drip, sore throat, ears feel full, ears popping.   CHEST: report cough   CARDIOVASCULAR: Denies chest pain, PND, orthopnea or reduced exercise tolerance.  ABDOMEN: Appetite fine. No weight loss. .  MUSCULOSKELETAL: No joint stiffness or swelling. Denies back pain.  NEUROLOGIC: No history of seizures, paralysis, alteration of gait or coordination.  PSYCHIATRIC: Denies mood swings, depression or suicidal thoughts.    PE:   APPEARANCE: Well nourished, well developed, in mild distress. Talkative, pulse ox 98%, [place]  V/S: Reviewed.  SKIN: Normal skin turgor, no lesions.  HEENT: Turbinates red,  minimal red pharynx, TM's poor light reflex bilaterally, minimal facial tenderness. Palpable Tender  left tonsillar lymph gland  CHEST: Lungs clear to auscultation. no wheezing  CARDIOVASCULAR: Regular rate and rhythm.  MUSCULOSKELETAL: Motor: 5/5 strength major flexors/extensors.  NEUROLOGIC: No sensory deficits. Gait & Posture: Normal, No cerebellar signs.  MENTAL STATUS: Patient alert, oriented x 3 & conversant.    PLAN:   Advise increase p.o. fluids-- water/juice & rest  Meds: Amoxil, Tessalon perles, Diflucan   / no refills  Simply saline nasal wash to irrigate sinuses and for congestion/runny nose.  Advise use of Flonase  Cool mist humidifier/vaporizer.  Practice good handwashing.  Advise use of Mucinex in am  Tylenol for fever, headache and body aches.  Chloraseptic spray or lozenges for throat comfort.  Advise follow up with PCP  Advise go to ER if symptoms worsen or fail  to improve with treatment.  AVS provided and reviewed with patient including supportive care, follow up, and red flag symptoms.   Patient verbalizes understanding and agrees with treatment plan. Discharged from Urgent Care in stable condition.    DIAGNOSIS:  Cough  Pharyngitis  Candidiasis  Hypertension  Lymphadenopathy  Acute bacterial sinusitis

## 2021-09-15 ENCOUNTER — OFFICE VISIT (OUTPATIENT)
Dept: URGENT CARE | Facility: CLINIC | Age: 86
End: 2021-09-15
Payer: MEDICARE

## 2021-09-15 VITALS
SYSTOLIC BLOOD PRESSURE: 113 MMHG | WEIGHT: 137.81 LBS | TEMPERATURE: 100 F | OXYGEN SATURATION: 89 % | BODY MASS INDEX: 25.61 KG/M2 | DIASTOLIC BLOOD PRESSURE: 75 MMHG | HEART RATE: 105 BPM

## 2021-09-15 DIAGNOSIS — R63.0 NO APPETITE: ICD-10-CM

## 2021-09-15 DIAGNOSIS — U07.1 COVID-19 VIRUS DETECTED: ICD-10-CM

## 2021-09-15 DIAGNOSIS — R10.9 ABDOMINAL PAIN, UNSPECIFIED ABDOMINAL LOCATION: ICD-10-CM

## 2021-09-15 DIAGNOSIS — R19.7 DIARRHEA, UNSPECIFIED TYPE: ICD-10-CM

## 2021-09-15 DIAGNOSIS — R68.89 FLU-LIKE SYMPTOMS: Primary | ICD-10-CM

## 2021-09-15 LAB
CTP QC/QA: YES
SARS-COV-2 RDRP RESP QL NAA+PROBE: POSITIVE

## 2021-09-15 PROCEDURE — 99214 PR OFFICE/OUTPT VISIT, EST, LEVL IV, 30-39 MIN: ICD-10-PCS | Mod: S$PBB,CS,, | Performed by: NURSE PRACTITIONER

## 2021-09-15 PROCEDURE — 99212 OFFICE O/P EST SF 10 MIN: CPT | Mod: PBBFAC,PO | Performed by: NURSE PRACTITIONER

## 2021-09-15 PROCEDURE — 99999 PR PBB SHADOW E&M-EST. PATIENT-LVL II: ICD-10-PCS | Mod: PBBFAC,,, | Performed by: NURSE PRACTITIONER

## 2021-09-15 PROCEDURE — 99999 PR PBB SHADOW E&M-EST. PATIENT-LVL II: CPT | Mod: PBBFAC,,, | Performed by: NURSE PRACTITIONER

## 2021-09-15 PROCEDURE — U0002 COVID-19 LAB TEST NON-CDC: HCPCS | Mod: PBBFAC,PO | Performed by: NURSE PRACTITIONER

## 2021-09-15 PROCEDURE — 99214 OFFICE O/P EST MOD 30 MIN: CPT | Mod: S$PBB,CS,, | Performed by: NURSE PRACTITIONER

## 2022-04-06 ENCOUNTER — OFFICE VISIT (OUTPATIENT)
Dept: OTOLARYNGOLOGY | Facility: CLINIC | Age: 87
End: 2022-04-06
Payer: MEDICARE

## 2022-04-06 VITALS — SYSTOLIC BLOOD PRESSURE: 110 MMHG | HEART RATE: 88 BPM | DIASTOLIC BLOOD PRESSURE: 70 MMHG | TEMPERATURE: 98 F

## 2022-04-06 DIAGNOSIS — J30.9 ALLERGIC RHINITIS, UNSPECIFIED SEASONALITY, UNSPECIFIED TRIGGER: Primary | ICD-10-CM

## 2022-04-06 DIAGNOSIS — M26.623 BILATERAL TEMPOROMANDIBULAR JOINT PAIN: ICD-10-CM

## 2022-04-06 PROCEDURE — 99213 OFFICE O/P EST LOW 20 MIN: CPT | Mod: PBBFAC | Performed by: OTOLARYNGOLOGY

## 2022-04-06 PROCEDURE — 99203 PR OFFICE/OUTPT VISIT, NEW, LEVL III, 30-44 MIN: ICD-10-PCS | Mod: S$PBB,,, | Performed by: OTOLARYNGOLOGY

## 2022-04-06 PROCEDURE — 99999 PR PBB SHADOW E&M-EST. PATIENT-LVL III: ICD-10-PCS | Mod: PBBFAC,,, | Performed by: OTOLARYNGOLOGY

## 2022-04-06 PROCEDURE — 99999 PR PBB SHADOW E&M-EST. PATIENT-LVL III: CPT | Mod: PBBFAC,,, | Performed by: OTOLARYNGOLOGY

## 2022-04-06 PROCEDURE — 99203 OFFICE O/P NEW LOW 30 MIN: CPT | Mod: S$PBB,,, | Performed by: OTOLARYNGOLOGY

## 2022-04-06 RX ORDER — AZELASTINE 1 MG/ML
1 SPRAY, METERED NASAL 2 TIMES DAILY
Qty: 30 ML | Refills: 9 | Status: SHIPPED | OUTPATIENT
Start: 2022-04-06 | End: 2024-12-30

## 2022-04-06 RX ORDER — PANTOPRAZOLE SODIUM 20 MG/1
20 TABLET, DELAYED RELEASE ORAL 2 TIMES DAILY
COMMUNITY
Start: 2022-02-01

## 2022-04-06 NOTE — PROGRESS NOTES
Referring Provider:    Ricci Self  No address on file  Subjective:   Patient: Eladio Staples [id number], :1930   Visit date:2022 4:12 PM    Chief Complaint: see below  HPI:       Other (Sore throat, ear pain, pulmonary edma)    Prior notes reviewed  Clinical documentation obtained by nursing staff reviewed.     On lasix - when stopped, pts weight increases and onset of thick, frothy mucus.   Follows with outside cardiology, Dr. Salter    Reported jaw pain/popping intermittently, pt does not have dentures. C/o ear pain. Hx of SNHL, last audio 2019.    C/o increased nasal congestion. Rhinorrhea. Increased sneezing after coming inside from outside.       Objective:     Physical Exam:  Vitals:  /70   Pulse 88   Temp 98 °F (36.7 °C) (Temporal)   General appearance:  Well developed, well nourished    Ears:  Otoscopy of external auditory canals and tympanic membranes was normal, clinical speech reception thresholds grossly intact, no mass/lesion of auricle.    Nose:  No masses/lesions of external nose, nasal mucosa, septum, and turbinates were within normal limits.    Mouth:  No mass/lesion of lips, teeth, gums, hard/soft palate, tongue, tonsils, or oropharynx.    Neck & Lymphatics:  No cervical lymphadenopathy, no neck mass/crepitus/ asymmetry, trachea is midline, no thyroid enlargement/tenderness/mass.        Assessment & Plan:   Allergic rhinitis, unspecified seasonality, unspecified trigger    Bilateral temporomandibular joint pain    Other orders  -     azelastine (ASTELIN) 137 mcg (0.1 %) nasal spray; 1 spray (137 mcg total) by Nasal route 2 (two) times daily.  Dispense: 30 mL; Refill: 9        Ear is normal.  Pain to palpation of TMJ L<<R.  Recommend follow up with DMD.  Likely would benefit from dentures    Nasal congestoin- trial of astelin    She has thick/frothy sputum when off of her lasix.  Her last echo was actually quite good with an EF of 57% in Feb of this year.  She does have an  upcoming pulmonary workup.   I do not see an otolarygologic etiology/pathology that would explain these symptoms and overall would favor that this is related to     Thank you for allowing me to participate in the care of Eladio.       Otilio Bishop MD, FACS [facility]   [facility]  [address]  [place] [address]  P: [phone]  F: [phone]

## 2023-05-05 ENCOUNTER — APPOINTMENT (OUTPATIENT)
Dept: LAB | Facility: HOSPITAL | Age: 88
End: 2023-05-05
Attending: NURSE PRACTITIONER
Payer: MEDICARE

## 2023-05-05 DIAGNOSIS — R30.0 DYSURIA: Primary | ICD-10-CM

## 2023-05-05 LAB
BILIRUB UR QL STRIP: NEGATIVE
CLARITY UR: CLEAR
COLOR UR: YELLOW
GLUCOSE UR QL STRIP: NEGATIVE
HGB UR QL STRIP: NEGATIVE
KETONES UR QL STRIP: NEGATIVE
LEUKOCYTE ESTERASE UR QL STRIP: NEGATIVE
NITRITE UR QL STRIP: NEGATIVE
PH UR STRIP: 6 [PH] (ref 5–8)
PROT UR QL STRIP: NEGATIVE
SP GR UR STRIP: 1.02 (ref 1–1.03)
URN SPEC COLLECT METH UR: NORMAL
UROBILINOGEN UR STRIP-ACNC: NEGATIVE EU/DL

## 2023-05-05 PROCEDURE — 81003 URINALYSIS AUTO W/O SCOPE: CPT | Performed by: NURSE PRACTITIONER

## 2023-05-05 PROCEDURE — 87088 URINE BACTERIA CULTURE: CPT | Performed by: NURSE PRACTITIONER

## 2023-05-05 PROCEDURE — 87186 SC STD MICRODIL/AGAR DIL: CPT | Performed by: NURSE PRACTITIONER

## 2023-05-05 PROCEDURE — 87086 URINE CULTURE/COLONY COUNT: CPT | Performed by: NURSE PRACTITIONER

## 2023-05-05 PROCEDURE — 87077 CULTURE AEROBIC IDENTIFY: CPT | Performed by: NURSE PRACTITIONER

## 2023-05-08 LAB — BACTERIA UR CULT: ABNORMAL
